# Patient Record
Sex: MALE | Race: WHITE | NOT HISPANIC OR LATINO | Employment: FULL TIME | URBAN - METROPOLITAN AREA
[De-identification: names, ages, dates, MRNs, and addresses within clinical notes are randomized per-mention and may not be internally consistent; named-entity substitution may affect disease eponyms.]

---

## 2018-02-02 ENCOUNTER — COMMUNICATION - HEALTHEAST (OUTPATIENT)
Dept: FAMILY MEDICINE | Facility: CLINIC | Age: 58
End: 2018-02-02

## 2018-02-02 ENCOUNTER — OFFICE VISIT - HEALTHEAST (OUTPATIENT)
Dept: FAMILY MEDICINE | Facility: CLINIC | Age: 58
End: 2018-02-02

## 2018-02-02 DIAGNOSIS — R68.89 FLU-LIKE SYMPTOMS: ICD-10-CM

## 2018-02-02 DIAGNOSIS — J10.1 INFLUENZA B: ICD-10-CM

## 2018-02-02 LAB
FLUAV AG SPEC QL IA: ABNORMAL
FLUBV AG SPEC QL IA: ABNORMAL

## 2018-02-02 ASSESSMENT — MIFFLIN-ST. JEOR: SCORE: 1668.81

## 2018-04-06 ENCOUNTER — COMMUNICATION - HEALTHEAST (OUTPATIENT)
Dept: FAMILY MEDICINE | Facility: CLINIC | Age: 58
End: 2018-04-06

## 2021-05-30 ENCOUNTER — RECORDS - HEALTHEAST (OUTPATIENT)
Dept: ADMINISTRATIVE | Facility: CLINIC | Age: 61
End: 2021-05-30

## 2021-05-31 VITALS — BODY MASS INDEX: 25.76 KG/M2 | WEIGHT: 184 LBS | HEIGHT: 71 IN

## 2021-06-15 NOTE — PROGRESS NOTES
1. Flu-like symptoms  Influenza A/B Rapid Test    CANCELED: Influenza High Dose, Seasonal 65+ yrs     Patient takes no prescription medications    ASSESSMENT/PLAN:     The following high BMI interventions were performed this visit: encouragement to exercise and weight monitoring    1. Flu-like symptoms    - Influenza A/B Rapid Test      She tested positive for influenza B today.  Prescription sent to pharmacy for Tamiflu.  Discussed infection control: Covering cough and handwashing.  Patient to remain off work until Monday, he may return back to work if he feels his symptoms are improved.    The visit lasted a total of 25 minutes face to face with the patient.  Over 50% of the time spent counseling and educating the patient about above content.      Karen Branham NP          SUBJECTIVE:  Williams Bassett is a 57 y.o. male who presents with 1 day of chills, headaches, body aches and fatigue.  He has been experiencing postnasal drainage for 2 weeks.  He states his symptoms have been constant and describes his headache pain as dull.  Reading factors: Laying flat and coughing.  Relieving factors: Tylenol as needed.  He is rating his generalized discomfort and headache pain a 6 out of 10 today.  He has not traveled recently, he has no history of asthma, diabetes and he does not smoke.  He was exposed to pneumonia by his father who was hospitalized for this last week at [facility].  Patient does have a history of TMJ and grinds his teeth at night, we did discuss this in regards to the pressure in his ears.  He is currently not wearing a bite guard at night while he sleeps.  His vital signs are stable today, he is afebrile.  Rapid influenza testing performed today.  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     No current facility-administered medications for this visit.        History   Smoking Status     Never Smoker   Smokeless Tobacco     Never Used       REVIEW OF SYSTEMS: Denies swollen glands/shortness of breath/discomfort of chest/abdominal pain/changes in bowel habits/urinary symptoms/rash.      TOBACCO USE:  History   Smoking Status     Never Smoker   Smokeless Tobacco     Never Used     Social History     Social History     Marital status:      Spouse name: N/A     Number of children: N/A     Years of education: N/A     Occupational History     Not on file.     Social History Main Topics     Smoking status: Never Smoker     Smokeless tobacco: Never Used     Alcohol use No     Drug use: Not on file     Sexual activity: Not on file     Other Topics Concern     Not on file     Social History Narrative         OBJECTIVE:            Vitals:    02/02/18 1019   BP: 120/76   Pulse: 86   Resp: 16   Temp: 98.7  F (37.1  C)   SpO2: 99%     Weight: 184 lb (83.5 kg)  Wt Readings from Last 3 Encounters:   02/02/18 184 lb (83.5 kg)   10/01/15 181 lb (82.1 kg)   08/26/15 181 lb (82.1 kg)     Body mass index is 26.03 kg/(m^2).        Physical Exam:  GENERAL APPEARANCE: A&A, NAD, well hydrated, well nourished  HEAD: atraumatic, no deformity,no sinus pain with palpation  EYES: PERRL, EOM's intact, no redness or swelling  EARS: TM's normal, gray with nl light reflex  NOSE: Thin, watery clear secretions bilateral nares  MOUTH: without erythema, exudate or thrush, notable grinding palpated bilaterally with opening and closing of mouth in TMJ space  NECK: Supple, without lymphadenopathy, no thyroid mass, no JVD, no bruit  CV: RRR, no M/G/R   LUNGS: CTAB, normal respiratory effort  ABDOMEN: S&NT, no masses, no  organomegaly, BS present x4   SKIN:  Normal skin turgor, no lesions/rashes, warm and dry

## 2021-06-16 PROBLEM — R68.89 FLU-LIKE SYMPTOMS: Status: ACTIVE | Noted: 2018-02-02

## 2022-05-20 ENCOUNTER — LAB REQUISITION (OUTPATIENT)
Dept: LAB | Facility: CLINIC | Age: 62
End: 2022-05-20

## 2022-05-20 DIAGNOSIS — Z13.1 ENCOUNTER FOR SCREENING FOR DIABETES MELLITUS: ICD-10-CM

## 2022-05-20 DIAGNOSIS — Z13.220 ENCOUNTER FOR SCREENING FOR LIPOID DISORDERS: ICD-10-CM

## 2022-05-20 LAB
ALBUMIN SERPL-MCNC: 4 G/DL (ref 3.5–5)
ALP SERPL-CCNC: 32 U/L (ref 45–120)
ALT SERPL W P-5'-P-CCNC: 19 U/L (ref 0–45)
ANION GAP SERPL CALCULATED.3IONS-SCNC: 6 MMOL/L (ref 5–18)
AST SERPL W P-5'-P-CCNC: 21 U/L (ref 0–40)
BILIRUB SERPL-MCNC: 0.5 MG/DL (ref 0–1)
BUN SERPL-MCNC: 15 MG/DL (ref 8–22)
CALCIUM SERPL-MCNC: 9.9 MG/DL (ref 8.5–10.5)
CHLORIDE BLD-SCNC: 102 MMOL/L (ref 98–107)
CHOLEST SERPL-MCNC: 224 MG/DL
CO2 SERPL-SCNC: 30 MMOL/L (ref 22–31)
CREAT SERPL-MCNC: 1.36 MG/DL (ref 0.7–1.3)
FASTING STATUS PATIENT QL REPORTED: ABNORMAL
GFR SERPL CREATININE-BSD FRML MDRD: 60 ML/MIN/1.73M2
GLUCOSE BLD-MCNC: 96 MG/DL (ref 70–125)
HDLC SERPL-MCNC: 45 MG/DL
LDLC SERPL CALC-MCNC: 158 MG/DL
POTASSIUM BLD-SCNC: 5 MMOL/L (ref 3.5–5)
PROT SERPL-MCNC: 7.2 G/DL (ref 6–8)
SODIUM SERPL-SCNC: 138 MMOL/L (ref 136–145)
TRIGL SERPL-MCNC: 106 MG/DL

## 2022-05-20 PROCEDURE — 80061 LIPID PANEL: CPT | Performed by: FAMILY MEDICINE

## 2022-05-20 PROCEDURE — 80053 COMPREHEN METABOLIC PANEL: CPT | Performed by: FAMILY MEDICINE

## 2022-05-24 ENCOUNTER — IMMUNIZATION (OUTPATIENT)
Dept: NURSING | Facility: CLINIC | Age: 62
End: 2022-05-24

## 2023-08-21 ENCOUNTER — OFFICE VISIT (OUTPATIENT)
Dept: RHEUMATOLOGY | Facility: CLINIC | Age: 63
End: 2023-08-21
Payer: COMMERCIAL

## 2023-08-21 ENCOUNTER — HOSPITAL ENCOUNTER (OUTPATIENT)
Dept: GENERAL RADIOLOGY | Facility: HOSPITAL | Age: 63
Discharge: HOME | End: 2023-08-21
Attending: INTERNAL MEDICINE
Payer: COMMERCIAL

## 2023-08-21 VITALS
HEART RATE: 94 BPM | OXYGEN SATURATION: 98 % | BODY MASS INDEX: 25.04 KG/M2 | DIASTOLIC BLOOD PRESSURE: 80 MMHG | WEIGHT: 177 LBS | SYSTOLIC BLOOD PRESSURE: 130 MMHG

## 2023-08-21 DIAGNOSIS — M10.9 ACUTE GOUTY ARTHROPATHY: ICD-10-CM

## 2023-08-21 DIAGNOSIS — M10.9 GOUT FLARE: ICD-10-CM

## 2023-08-21 DIAGNOSIS — M1A.0720 IDIOPATHIC CHRONIC GOUT OF LEFT FOOT WITHOUT TOPHUS: ICD-10-CM

## 2023-08-21 DIAGNOSIS — M10.9 ACUTE GOUTY ARTHROPATHY: Primary | ICD-10-CM

## 2023-08-21 PROCEDURE — 73630 X-RAY EXAM OF FOOT: CPT | Mod: 50

## 2023-08-21 PROCEDURE — 99204 OFFICE O/P NEW MOD 45 MIN: CPT | Mod: 25 | Performed by: INTERNAL MEDICINE

## 2023-08-21 PROCEDURE — 20604 DRAIN/INJ JOINT/BURSA W/US: CPT | Mod: LT | Performed by: INTERNAL MEDICINE

## 2023-08-21 RX ORDER — PREDNISONE 10 MG/1
10 TABLET ORAL DAILY
Qty: 7 TABLET | Refills: 0 | Status: SHIPPED | OUTPATIENT
Start: 2023-08-21 | End: 2023-08-28

## 2023-08-21 NOTE — PROGRESS NOTES
This document was created using a software with less than 100% fidelity, at times resulting in unintended, even erroneous syntax and grammar.  The reader is advised to keep this under consideration while reviewing, interpreting this note.      Rheumatology Consult Note      Williams Bassett     YOB: 1960 Age: 63 year old    Date of visit: 8/21/23    PCP: Stephane Sun    Chief Complaint   Patient presents with:  Consult      Assessment and Plan     Williams was seen today for consult.    Diagnoses and all orders for this visit:    Acute Gout  -     Discontinue: triamcinolone acetonide (KENALOG-10) injection 10 mg  -     LA ARTHROCNT ASPIR&/INJ SMALL JT/BURSAW/US REC RPRT  -     XR Foot Bilateral G/E 3 Views; Future  -     predniSONE (DELTASONE) 10 MG tablet; Take 1 tablet (10 mg) by mouth daily for 7 days  -     triamcinolone acetonide (KENALOG-10) injection 20 mg    Idiopathic chronic gout of left foot without tophus  -     XR Foot Bilateral G/E 3 Views; Future           This patient presents after 8 years of until with acute onset of gout in his left foot most of the information is epicentered of the left second MTP.  He would like to proceed local injection after pros and cons were outlined 10 mg of Kenalog injected into the left second MTP under ultrasound guidance.  We will take x-rays of the feet.  Once his current situation such subsides we will meet here and decide as to the indications for long-term allopurinol going by clinical findings only he may not necessarily require allopurinol as is a significant episode was once in 8 years.  However should there be bone damage that could alter the balance.  We will meet here in the next 2 to 3 months.X-ray of the feet were done today, personally reviewed the films, findings: He has cystic/erosive area on the head of the left second metatarsal bone on the lateral aspect.    Return to clinic: 2 months      HPI   Williams Bassett is a 63 year old male  is  "seen today for evaluation of acute pain in his left foot.  This started 72 hours ago, reached peak around 36 hours ago, reminiscent of his gout episode when he was seen here in 2015.  In the interim he has had \"1 or 2 mild\" episodes of recurrence.  He is not on preventative medication such as allopurinol.  He tried over-the-counter measures without help.  The pain is noted to be severe he was unable to bear weight he came to the office on crutches accompanied by his wife.  He reports no history of kidney stones in the interim.  He has not had any significant flare apart from the current 1.  He has not had his serum urate checked.  He has been careful gently with regards to hydration, diet.  Currently he is under a lot of stress remodeling his basement being on his feet quite a bit and feels that he may have been dehydrated.  He has not had any significant joint symptoms otherwise..           Active Problem List     Patient Active Problem List   Diagnosis    Hay Fever    Hyperlipidemia    Lumbar Strain    Acute Gout    Polyarthritis Of The Foot    Foot Pain (Soft Tissue)    Idiopathic chronic gout of left foot without tophus    Flu-like symptoms     Past Medical History   No past medical history on file.  Past Surgical History     Past Surgical History:   Procedure Laterality Date    C UNLISTED PROCEDURE, ABDOMEN/PERITONEUM/OMENTUM      Description: Hernia Repair;  Recorded: 04/21/2009;  Comments: 1978--inguinal    HC REMOVAL GALLBLADDER      Description: Cholecystectomy;  Recorded: 04/21/2009;  Comments: 2004    HC REMOVAL OF TONSILS,<13 Y/O      Description: Tonsillectomy;  Recorded: 04/21/2009;  Comments: 1967     Allergy     Allergies   Allergen Reactions    House Dust [Dust Mite Extract] Unknown    Pollen [Pollen Extract] Unknown     Current Medication List     No current outpatient medications on file.     No current facility-administered medications for this visit.            Family History   No family history " on file.      Physical Exam     COMPREHENSIVE EXAMINATION:  Vitals:    08/21/23 0950   BP: 130/80   Pulse: 94   SpO2: 98%   Weight: 80.3 kg (177 lb)     A well appearing alert oriented male. Vital data as noted above. His eyes examined for inflammation/scleromalacia. ENT examined for oral mucositis, moisture, thrush, nasal deformity, external ear redness, deformity. His neck is examined for suppleness and lymphadenopathy.  Cardiopulmonary examination without dyspnea at rest, use of accessory muscles of breathing, wheezing, edema, peripheral or central cyanosis.  Abdomen examined for softness, tenderness, obvious organomegaly.  Skin examined for heliotrope, malar area eruption, lupus pernio, periungual erythema, sclerodactyly, papules, erythema nodosum, purpura, nail pitting, onycholysis, and obvious psoriasis lesion. Neurological examination done for alertness, speech, facial symmetry,  tone and power in upper and lower extremities, and gait. The joint examination is performed for swelling, tenderness, warmth, erythema, and range of motion in the following joints: DIPs, PIPs, MCPs, wrists, first CMC's, elbows, shoulders, hips, knees, ankles, feet; spine for range of motion and paraspinal muscles for tenderness. The salient normal / abnormal findings are appended.  He has redness tenderness swelling of the left second metatarsophalangeal joint.  The left third and fourth metatarsal joints are tender but not hyperemic.  The first MTPs without tenderness or swelling.  Ultrasound examination shows a tophus and inflammatory signals in the second and MTP of the left foot as well as a double contour sign in 2 axes on the left second and third MTPs.    Labs / Imaging (select studies)     No results found for: PPDINDURATIO, PPDREDNESS, TBGOLT, RHF, CCPABG, URIC, [id number], [id number], ANTIDNA, ANTIDNAINT, CARIA, [id number], [id number], ENASM, ENASCL, JO1, ENASSA, ENASSB, CENTA, [id number], [id number], [id number]   Urea Nitrogen   Date Value  Ref Range Status   05/20/2022 15 8 - 22 mg/dL Final     AST   Date Value Ref Range Status   05/20/2022 21 0 - 40 U/L Final     Albumin   Date Value Ref Range Status   05/20/2022 4.0 3.5 - 5.0 g/dL Final     Alkaline Phosphatase   Date Value Ref Range Status   05/20/2022 32 (L) 45 - 120 U/L Final     ALT   Date Value Ref Range Status   05/20/2022 19 0 - 45 U/L Final          Immunization History     Immunization History   Administered Date(s) Administered    COVID-19 Bivalent 18+ (Moderna) 11/07/2022    COVID-19 Monovalent 18+ (Moderna) 03/13/2021, 04/10/2021    COVID-19 Monovalent Booster 18+ (Moderna) 12/29/2021, 05/24/2022    TDAP (Adacel,Boostrix) 04/21/2009

## 2023-12-26 ENCOUNTER — TELEPHONE (OUTPATIENT)
Dept: RHEUMATOLOGY | Facility: CLINIC | Age: 63
End: 2023-12-26
Payer: COMMERCIAL

## 2023-12-26 NOTE — TELEPHONE ENCOUNTER
[facility] Call Center    Phone Message    May a detailed message be left on voicemail: yes     Reason for Call: Other: Pt calling dealing with gout flare up for about two weeks now. Would like to be seen asap.Dr. Blair's next opening is not till Williams 15. Please follow up with pt.     Action Taken: Other: Rheum    Travel Screening: Not Applicable

## 2023-12-27 NOTE — TELEPHONE ENCOUNTER
Patient declined appt because he went to urgent care and received a prescription. He will call back if he needs any further assistance.

## 2023-12-27 NOTE — TELEPHONE ENCOUNTER
Pt still needs follow up. Pt was due for follow up in October. Please call pt and offer first available appt.

## 2024-01-09 ENCOUNTER — OFFICE VISIT (OUTPATIENT)
Dept: RHEUMATOLOGY | Facility: CLINIC | Age: 64
End: 2024-01-09
Payer: COMMERCIAL

## 2024-01-09 VITALS
DIASTOLIC BLOOD PRESSURE: 78 MMHG | HEART RATE: 70 BPM | OXYGEN SATURATION: 99 % | SYSTOLIC BLOOD PRESSURE: 126 MMHG | WEIGHT: 178.5 LBS | BODY MASS INDEX: 25.25 KG/M2

## 2024-01-09 DIAGNOSIS — M10.9 ACUTE GOUTY ARTHROPATHY: Primary | ICD-10-CM

## 2024-01-09 PROCEDURE — 99214 OFFICE O/P EST MOD 30 MIN: CPT | Mod: 25 | Performed by: INTERNAL MEDICINE

## 2024-01-09 PROCEDURE — 20604 DRAIN/INJ JOINT/BURSA W/US: CPT | Mod: RT | Performed by: INTERNAL MEDICINE

## 2024-01-09 RX ORDER — ROSUVASTATIN CALCIUM 10 MG/1
10 TABLET, COATED ORAL DAILY
COMMUNITY

## 2024-01-09 RX ORDER — EZETIMIBE 10 MG/1
10 TABLET ORAL DAILY
COMMUNITY

## 2024-01-09 NOTE — PROGRESS NOTES
Rheumatology follow-up visit note     Williams is a 63 year old male presents today for follow-up.    Williams was seen today for recheck.    Diagnoses and all orders for this visit:    Acute gouty arthropathy  -     triamcinolone acetonide (KENALOG-10) injection 10 mg  -     ME ARTHROCNT ASPIR&/INJ SMALL JT/BURSAW/US REC RPRT  -     Uric acid; Standing  -     Creatinine; Standing  -     Albumin level; Standing  -     CBC with platelets; Standing        He has relapse of acute gout in his right second metatarsophalangeal joints he would still like to see how he might do without beer, and would like to pursue local injection after pros and cons were outlined 10 mg of Kenalog injected into the right second MTP under ultrasound guidance which showed not only active inflammation signals with Doppler as well as features suggestive of a tophus.  He will check his labs as he would like to, but not today however in the next few weeks and the rational for that was outlined.  Follow-up as needed.     HPI    Williams Bassett is a 63 year old male is here for worsening acute pain in his left foot.  This started around [place].  Reached peak around 36 hours ago, reminiscent of his gout episode when he was seen here in August 2023 and prior to that in 2015.  He was seen elsewhere given by prednisone and indomethacin.  I have asked him not to take this combination in the future especially given his borderline renal impairment.  There has been some improvement yet he has pain in his left foot with ambulation.  His wife accompanied him today.  She observed that on both occasions they had uncommonly for them enjoyed some beer while they have been remodeling their basement..     DETAILED EXAMINATION  01/09/24  :    Vitals:    01/09/24 1336   BP: 126/78   Pulse: 70   SpO2: 99%   Weight: 81 kg (178 lb 8 oz)     Alert oriented. Head including the face is examined for malar rash, heliotropes, scarring, lupus pernio. Eyes examined for  redness such as in episcleritis/scleritis, periorbital lesions.   Neck/ Face examined for parotid gland swelling, range of motion of neck.  Left upper and lower and right upper and lower extremities examined for tenderness, swelling, warmth of the appendicular joints, range of motion, edema, rash.  Some of the important findings included: he does not have evidence of synovitis in the palpable joints of the upper extremities.  He is tender in the right second metatarsal phalangeal joint.  He has swelling and warmth in the joint.  Patient Active Problem List    Diagnosis Date Noted    Flu-like symptoms 02/02/2018     Priority: Medium    Hay Fever      Priority: Medium     Created by Conversion  [facility] Annotation: Apr 21 2009  2:55PM - Fredy Chacon: spring and   early   summer, worseinhaler for worse days-rare; claritin prnPrior testing and   shots  Replacement Utility updated for latest IMO load        Polyarthritis Of The Foot      Priority: Medium     Created by Conversion  Replacement Utility updated for latest IMO load        Idiopathic chronic gout of left foot without tophus 10/01/2015     Priority: Medium    Acute Gout      Priority: Medium     Created by Conversion        Foot Pain (Soft Tissue)      Priority: Medium     Created by Conversion        Hyperlipidemia      Priority: Medium     Created by Conversion  [facility] Annotation: Apr 17 2012  9:22AM -  ,  : LDl to 166        Lumbar Strain      Priority: Medium     Created by Conversion         Past Surgical History:   Procedure Laterality Date    C UNLISTED PROCEDURE, ABDOMEN/PERITONEUM/OMENTUM      Description: Hernia Repair;  Recorded: 04/21/2009;  Comments: 1978--inguinal    HC REMOVAL GALLBLADDER      Description: Cholecystectomy;  Recorded: 04/21/2009;  Comments: 2004    HC REMOVAL OF TONSILS,<13 Y/O      Description: Tonsillectomy;  Recorded: 04/21/2009;  Comments: 1967      No past medical history on file.  Allergies   Allergen Reactions     House Dust [Dust Mite Extract] Unknown    Pollen [Pollen Extract] Unknown     No current outpatient medications on file.     family history is not on file.  Social Connections: Not on file          AST   Date Value Ref Range Status   05/20/2022 21 0 - 40 U/L Final     ALT   Date Value Ref Range Status   05/20/2022 19 0 - 45 U/L Final     Albumin   Date Value Ref Range Status   05/20/2022 4.0 3.5 - 5.0 g/dL Final     Alkaline Phosphatase   Date Value Ref Range Status   05/20/2022 32 (L) 45 - 120 U/L Final     Creatinine   Date Value Ref Range Status   05/20/2022 1.36 (H) 0.70 - 1.30 mg/dL Final     GFR Estimate   Date Value Ref Range Status   05/20/2022 60 (L) >60 mL/min/1.73m2 Final     Comment:     Effective December 21, 2021 eGFRcr in adults is calculated using the 2021 CKD-EPI creatinine equation which includes age and gender (Alyssa lara al., NEJM, DOI: 10.1056/SMESkt3191776)

## 2024-01-10 ENCOUNTER — TELEPHONE (OUTPATIENT)
Dept: RHEUMATOLOGY | Facility: CLINIC | Age: 64
End: 2024-01-10
Payer: COMMERCIAL

## 2024-01-10 DIAGNOSIS — M10.9 ACUTE GOUTY ARTHROPATHY: Primary | ICD-10-CM

## 2024-01-10 RX ORDER — PREDNISONE 10 MG/1
10 TABLET ORAL DAILY
Qty: 10 TABLET | Refills: 0 | Status: SHIPPED | OUTPATIENT
Start: 2024-01-10

## 2024-01-10 NOTE — TELEPHONE ENCOUNTER
[facility] Call Center    Phone Message    May a detailed message be left on voicemail: no     Reason for Call: Other: Williams is calling to check on when Dr. Blair will call in his rx for Prednisone. [place] of this afternoon [place] [facility] [id number] [place] [address] thank you     Action Taken: Other: Rheum    Travel Screening: Not Applicable

## 2024-01-10 NOTE — TELEPHONE ENCOUNTER
Per Dr Blair- pt can take prednisone 10mg daily for 10 days. Rx sent to pharmacy per Dr Blair    Pt notified.

## 2024-02-06 ENCOUNTER — LAB (OUTPATIENT)
Dept: LAB | Facility: CLINIC | Age: 64
End: 2024-02-06
Payer: COMMERCIAL

## 2024-02-06 DIAGNOSIS — M10.9 ACUTE GOUTY ARTHROPATHY: ICD-10-CM

## 2024-02-06 LAB
ALBUMIN SERPL BCG-MCNC: 4.5 G/DL (ref 3.5–5.2)
CREAT SERPL-MCNC: 1.37 MG/DL (ref 0.67–1.17)
EGFRCR SERPLBLD CKD-EPI 2021: 59 ML/MIN/1.73M2
ERYTHROCYTE [DISTWIDTH] IN BLOOD BY AUTOMATED COUNT: 12 % (ref 10–15)
HCT VFR BLD AUTO: 44.7 % (ref 40–53)
HGB BLD-MCNC: 14.9 G/DL (ref 13.3–17.7)
MCH RBC QN AUTO: 31.1 PG (ref 26.5–33)
MCHC RBC AUTO-ENTMCNC: 33.3 G/DL (ref 31.5–36.5)
MCV RBC AUTO: 93 FL (ref 78–100)
PLATELET # BLD AUTO: 213 10E3/UL (ref 150–450)
RBC # BLD AUTO: 4.79 10E6/UL (ref 4.4–5.9)
URATE SERPL-MCNC: 6.7 MG/DL (ref 3.4–7)
WBC # BLD AUTO: 5.1 10E3/UL (ref 4–11)

## 2024-02-06 PROCEDURE — 84550 ASSAY OF BLOOD/URIC ACID: CPT

## 2024-02-06 PROCEDURE — 36415 COLL VENOUS BLD VENIPUNCTURE: CPT

## 2024-02-06 PROCEDURE — 85027 COMPLETE CBC AUTOMATED: CPT

## 2024-02-06 PROCEDURE — 82040 ASSAY OF SERUM ALBUMIN: CPT

## 2024-02-06 PROCEDURE — 82565 ASSAY OF CREATININE: CPT

## 2024-03-09 ENCOUNTER — HEALTH MAINTENANCE LETTER (OUTPATIENT)
Age: 64
End: 2024-03-09

## 2025-03-16 ENCOUNTER — HEALTH MAINTENANCE LETTER (OUTPATIENT)
Age: 65
End: 2025-03-16